# Patient Record
(demographics unavailable — no encounter records)

---

## 2024-12-23 NOTE — PHYSICAL EXAM
[Alert] : alert [Well Nourished] : well nourished [No Acute Distress] : no acute distress [Well Developed] : well developed [Normal Sclera/Conjunctiva] : normal sclera/conjunctiva [No Proptosis] : no proptosis [No LAD] : no lymphadenopathy [Thyroid Not Enlarged] : the thyroid was not enlarged [No Thyroid Nodules] : no palpable thyroid nodules [No Respiratory Distress] : no respiratory distress [No Accessory Muscle Use] : no accessory muscle use [Normal Rate and Effort] : normal respiratory rate and effort [Clear to Auscultation] : lungs were clear to auscultation bilaterally [Normal S1, S2] : normal S1 and S2 [Normal Rate] : heart rate was normal [Regular Rhythm] : with a regular rhythm [No Rash] : no rash [Acanthosis Nigricans] : no acanthosis nigricans [No Tremors] : no tremors [Oriented x3] : oriented to person, place, and time [Normal Affect] : the affect was normal [Normal Insight/Judgement] : insight and judgment were intact [Normal Mood] : the mood was normal

## 2024-12-23 NOTE — HISTORY OF PRESENT ILLNESS
[FreeTextEntry1] : Here with daughter.  Post-ablative hypothyroidism, treated in 1982. First treated between 7547-3992. Then resumed care as of 2012.  Multiple stepwise reduction in thyroid hormone dosing. Last TSH 17.7, changed to Levothyroxine 100 mcg daily 2 months ago due to high TSH.   C/o hair loss and fatigue    PMH: pre-diabetes found to have Hodgkin's disease, in remission s/p chemo

## 2024-12-23 NOTE — REVIEW OF SYSTEMS
[Fatigue] : fatigue [Recent Weight Loss (___ Lbs)] : recent weight loss: [unfilled] lbs [Visual Field Defect] : visual field defect [Hair Loss] : hair loss [Anxiety] : anxiety [Decreased Appetite] : appetite not decreased [Blurred Vision] : no blurred vision [Dysphagia] : no dysphagia [Neck Pain] : no neck pain [Dysphonia] : no dysphonia [Chest Pain] : no chest pain [Palpitations] : no palpitations [Constipation] : no constipation [Diarrhea] : no diarrhea [Dry Skin] : no dry skin [Headaches] : no headaches [Tremors] : no tremors [Depression] : no depression

## 2024-12-23 NOTE — ASSESSMENT
[Levothyroxine] : The patient was instructed to take Levothyroxine on an empty stomach, separate from vitamins, and wait at least 30 minutes before eating [FreeTextEntry1] : Hypothyroid, TSH elevated on last labs, no recent labs since Levothyroxine dose was increased, check TFTs in office today. Continue current dose of Levothyroxine for now.   Prediabetes: continue lifestyle modifications, monitor A1c.   RTO in 8 weeks with NP and 8 months with MD.

## 2025-02-18 NOTE — PHYSICAL EXAM
[Alert] : alert [Well Nourished] : well nourished [No Acute Distress] : no acute distress [Well Developed] : well developed [Normal Sclera/Conjunctiva] : normal sclera/conjunctiva [No Proptosis] : no proptosis [No LAD] : no lymphadenopathy [Thyroid Not Enlarged] : the thyroid was not enlarged [No Thyroid Nodules] : no palpable thyroid nodules [No Respiratory Distress] : no respiratory distress [No Accessory Muscle Use] : no accessory muscle use [Normal Rate and Effort] : normal respiratory rate and effort [Normal Bowel Sounds] : normal bowel sounds [Not Tender] : non-tender [Soft] : abdomen soft [Oriented x3] : oriented to person, place, and time [Normal Affect] : the affect was normal [Normal Insight/Judgement] : insight and judgment were intact [Normal Mood] : the mood was normal [de-identified] : left lobe with crackles

## 2025-02-18 NOTE — ASSESSMENT
[Levothyroxine] : The patient was instructed to take Levothyroxine on an empty stomach, separate from vitamins, and wait at least 30 minutes before eating [FreeTextEntry1] : Hypothyroid, last TSH was normal, check TFTs in office today. Continue current dose of Levothyroxine for now.  Prediabetes: continue lifestyle modifications, check A1c in office today.  Overweight: start Wegovy 0.25 mg weekly, if tolerating well in 4 weeks increase to 0.5 mg weekly, reviewed s/e, denies history of pancreatitis and/or Thyroid Medullary Cancer.  Follow up with pulmonology r/t crackles in lungs  RTO in 5 months with MD.

## 2025-02-18 NOTE — HISTORY OF PRESENT ILLNESS
[FreeTextEntry1] : Here with daughter.  Post-ablative hypothyroidism, treated in 1982. First treated between [id number]. Then resumed care as of 2012.  Multiple stepwise reduction in thyroid hormone dosing. Last TSH 0.76, takes Levothyroxine 100 mcg daily  C/o hair loss and unable to lose weight   PMH: pre-diabetes - last A1c 6.0% found to have Hodgkin's disease, in remission s/p chemo

## 2025-02-18 NOTE — REVIEW OF SYSTEMS
[Neck Pain] : neck pain [Incontinence] : incontinence [Fatigue] : no fatigue [Decreased Appetite] : appetite not decreased [Recent Weight Gain (___ Lbs)] : no recent weight gain [Recent Weight Loss (___ Lbs)] : no recent weight loss [Visual Field Defect] : no visual field defect [Blurred Vision] : no blurred vision [Dysphagia] : no dysphagia [Dysphonia] : no dysphonia [Chest Pain] : no chest pain [Palpitations] : no palpitations [Constipation] : no constipation [Diarrhea] : no diarrhea [Polyuria] : no polyuria [Dysuria] : no dysuria [Dry Skin] : no dry skin [Hair Loss] : no hair loss [Headaches] : no headaches [Tremors] : no tremors [Depression] : no depression [Anxiety] : no anxiety [FreeTextEntry2] : weight stable [FreeTextEntry4] : posterior neck pain

## 2025-07-14 NOTE — ASSESSMENT
[Levothyroxine] : The patient was instructed to take Levothyroxine on an empty stomach, separate from vitamins, and wait at least 30 minutes before eating [FreeTextEntry1] : Hypothyroid, last TSH was normal, check TFTs in office today. Continue current dose of Levothyroxine for now.  Prediabetes: continue lifestyle modifications, check A1c in office today.  Overweight: continue lifestyle modifications     RTO in 6 months with NP

## 2025-07-14 NOTE — HISTORY OF PRESENT ILLNESS
[FreeTextEntry1] : No changes since last visit   Post-ablative hypothyroidism, treated in 1982. First treated between [id number]. Then resumed care as of 2012.  Multiple stepwise reduction in thyroid hormone dosing. Last TSH 0.77, takes Levothyroxine 100 mcg daily C/o hair loss and unable to lose weight   PMH: pre-diabetes - last A1c 5.7% - insurance did not cover Saxenda for weight loss found to have Hodgkin's disease, in remission s/p chemo

## 2025-07-14 NOTE — REVIEW OF SYSTEMS
[Fatigue] : no fatigue [Decreased Appetite] : appetite not decreased [Recent Weight Gain (___ Lbs)] : no recent weight gain [Recent Weight Loss (___ Lbs)] : no recent weight loss [Visual Field Defect] : no visual field defect [Blurred Vision] : no blurred vision [Dysphagia] : no dysphagia [Neck Pain] : neck pain [Dysphonia] : no dysphonia [Chest Pain] : no chest pain [Palpitations] : no palpitations [Constipation] : no constipation [Diarrhea] : no diarrhea [Polyuria] : no polyuria [Dysuria] : no dysuria [Incontinence] : incontinence [Dry Skin] : dry skin [Hair Loss] : no hair loss [Headaches] : no headaches [Tremors] : no tremors [Depression] : no depression [Anxiety] : no anxiety [Polydipsia] : no polydipsia [FreeTextEntry2] : weight stable [FreeTextEntry4] : posterior neck pain  [FreeTextEntry8] : followed by urology